# Patient Record
Sex: FEMALE | Race: WHITE
[De-identification: names, ages, dates, MRNs, and addresses within clinical notes are randomized per-mention and may not be internally consistent; named-entity substitution may affect disease eponyms.]

---

## 2020-07-25 ENCOUNTER — HOSPITAL ENCOUNTER (EMERGENCY)
Dept: HOSPITAL 26 - MED | Age: 50
LOS: 1 days | Discharge: HOME | End: 2020-07-26
Payer: SELF-PAY

## 2020-07-25 VITALS — SYSTOLIC BLOOD PRESSURE: 131 MMHG | DIASTOLIC BLOOD PRESSURE: 73 MMHG

## 2020-07-25 VITALS — HEIGHT: 63 IN | WEIGHT: 120 LBS | BODY MASS INDEX: 21.26 KG/M2
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
## 2020-07-25 DIAGNOSIS — S52.501A: Primary | ICD-10-CM
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
## 2020-07-25 DIAGNOSIS — Z79.899: ICD-10-CM

## 2020-07-25 NOTE — NUR
Pt presents to ED with complaints of right hand pain s/p fall approximately 1 
hour PTA. Pt states she used her hand to break her fall and now c/o severe pain 
to right thumb and hand. CMS intact. Pt has swelling to right thumb base. Pt is 
tearful d/t pain. Radial pulse +2. Pt was brought in by .

## 2020-07-26 VITALS — SYSTOLIC BLOOD PRESSURE: 131 MMHG | DIASTOLIC BLOOD PRESSURE: 73 MMHG

## 2020-07-26 NOTE — NUR
PTS RIGHT HAND WAS PLACED IN A SUGAR TONG SPLINT. PTS PMSC WNL. PTS RIGHT ARM 
WAS PLACED IN A SHOULDER IMOBOLIZER.

## 2020-07-26 NOTE — NUR
Patient discharged with v/s stable. Written and verbal after care instructions 
given and explained. 

Patient alert, oriented and verbalized understanding of instructions. 
Ambulatory with steady gait. All questions addressed prior to discharge. ID 
band removed. Patient advised to follow up with PMD. Rx of NORCO given. Patient 
educated on indication of medication including possible reaction and side 
effects. Opportunity to ask questions provided and answered. Cast care 
information provided and pt vocalized understanding